# Patient Record
Sex: MALE | Race: WHITE | URBAN - METROPOLITAN AREA
[De-identification: names, ages, dates, MRNs, and addresses within clinical notes are randomized per-mention and may not be internally consistent; named-entity substitution may affect disease eponyms.]

---

## 2017-04-03 ENCOUNTER — OFFICE VISIT (OUTPATIENT)
Dept: INTERNAL MEDICINE | Age: 52
End: 2017-04-03

## 2017-04-03 VITALS
SYSTOLIC BLOOD PRESSURE: 122 MMHG | OXYGEN SATURATION: 99 % | HEART RATE: 80 BPM | DIASTOLIC BLOOD PRESSURE: 94 MMHG | BODY MASS INDEX: 27.4 KG/M2 | WEIGHT: 202 LBS

## 2017-04-03 DIAGNOSIS — E78.6 HDL DEFICIENCY: Chronic | ICD-10-CM

## 2017-04-03 DIAGNOSIS — R73.9 HYPERGLYCEMIA: Chronic | ICD-10-CM

## 2017-04-03 DIAGNOSIS — E78.1 HYPERTRIGLYCERIDEMIA: Chronic | ICD-10-CM

## 2017-04-03 DIAGNOSIS — R73.03 PREDIABETES: Primary | Chronic | ICD-10-CM

## 2017-04-03 DIAGNOSIS — R74.8 ELEVATED LIVER ENZYMES: ICD-10-CM

## 2017-04-03 DIAGNOSIS — N18.3 CHRONIC KIDNEY DISEASE (CKD), STAGE 3 (MODERATE): Chronic | ICD-10-CM

## 2017-04-03 DIAGNOSIS — R97.20 ELEVATED PSA: ICD-10-CM

## 2017-04-03 LAB
ALBUMIN SERPL-MCNC: 4.7 G/DL (ref 3.4–5)
ALP BLD-CCNC: 97 U/L (ref 40–129)
ALT SERPL-CCNC: 21 U/L (ref 10–40)
ANION GAP SERPL CALCULATED.3IONS-SCNC: 13 MMOL/L (ref 3–16)
AST SERPL-CCNC: 17 U/L (ref 15–37)
BILIRUB SERPL-MCNC: 0.3 MG/DL (ref 0–1)
BILIRUBIN DIRECT: <0.2 MG/DL (ref 0–0.3)
BILIRUBIN, INDIRECT: NORMAL MG/DL (ref 0–1)
BUN BLDV-MCNC: 14 MG/DL (ref 7–20)
CALCIUM SERPL-MCNC: 9.4 MG/DL (ref 8.3–10.6)
CHLORIDE BLD-SCNC: 102 MMOL/L (ref 99–110)
CHOLESTEROL, TOTAL: 189 MG/DL (ref 0–199)
CO2: 25 MMOL/L (ref 21–32)
CREAT SERPL-MCNC: 0.9 MG/DL (ref 0.9–1.3)
GFR AFRICAN AMERICAN: >60
GFR NON-AFRICAN AMERICAN: >60
GLUCOSE BLD-MCNC: 109 MG/DL (ref 70–99)
HDLC SERPL-MCNC: 41 MG/DL (ref 40–60)
LDL CHOLESTEROL CALCULATED: 111 MG/DL
PHOSPHORUS: 3.2 MG/DL (ref 2.5–4.9)
POTASSIUM SERPL-SCNC: 4.8 MMOL/L (ref 3.5–5.1)
PROSTATE SPECIFIC ANTIGEN: 3.09 NG/ML (ref 0–4)
SODIUM BLD-SCNC: 140 MMOL/L (ref 136–145)
TOTAL PROTEIN: 7.2 G/DL (ref 6.4–8.2)
TRIGL SERPL-MCNC: 185 MG/DL (ref 0–150)
VLDLC SERPL CALC-MCNC: 37 MG/DL

## 2017-04-03 PROCEDURE — 99214 OFFICE O/P EST MOD 30 MIN: CPT | Performed by: INTERNAL MEDICINE

## 2017-04-04 ENCOUNTER — TELEPHONE (OUTPATIENT)
Dept: INTERNAL MEDICINE | Age: 52
End: 2017-04-04

## 2017-04-04 LAB
ESTIMATED AVERAGE GLUCOSE: 125.5 MG/DL
HBA1C MFR BLD: 6 %

## 2017-04-08 RX ORDER — LORATADINE 10 MG/1
10 TABLET ORAL DAILY
COMMUNITY
Start: 2017-04-08 | End: 2022-08-09

## 2017-04-08 ASSESSMENT — ENCOUNTER SYMPTOMS
SHORTNESS OF BREATH: 0
RESPIRATORY NEGATIVE: 1
GASTROINTESTINAL NEGATIVE: 1
EYES NEGATIVE: 1

## 2019-02-21 ENCOUNTER — OFFICE VISIT (OUTPATIENT)
Dept: DERMATOLOGY | Age: 54
End: 2019-02-21
Payer: COMMERCIAL

## 2019-02-21 DIAGNOSIS — L82.1 SK (SEBORRHEIC KERATOSIS): ICD-10-CM

## 2019-02-21 DIAGNOSIS — L73.8 SEBACEOUS GLAND HYPERPLASIA OF FACE: ICD-10-CM

## 2019-02-21 DIAGNOSIS — L65.9 ALOPECIA: Primary | ICD-10-CM

## 2019-02-21 PROCEDURE — G8421 BMI NOT CALCULATED: HCPCS | Performed by: DERMATOLOGY

## 2019-02-21 PROCEDURE — 3017F COLORECTAL CA SCREEN DOC REV: CPT | Performed by: DERMATOLOGY

## 2019-02-21 PROCEDURE — 11104 PUNCH BX SKIN SINGLE LESION: CPT | Performed by: DERMATOLOGY

## 2019-02-21 PROCEDURE — 11105 PUNCH BX SKIN EA SEP/ADDL: CPT | Performed by: DERMATOLOGY

## 2019-02-21 PROCEDURE — G8484 FLU IMMUNIZE NO ADMIN: HCPCS | Performed by: DERMATOLOGY

## 2019-02-21 PROCEDURE — 99213 OFFICE O/P EST LOW 20 MIN: CPT | Performed by: DERMATOLOGY

## 2019-02-21 PROCEDURE — G8427 DOCREV CUR MEDS BY ELIG CLIN: HCPCS | Performed by: DERMATOLOGY

## 2019-02-21 PROCEDURE — 1036F TOBACCO NON-USER: CPT | Performed by: DERMATOLOGY

## 2019-03-01 ENCOUNTER — NURSE ONLY (OUTPATIENT)
Dept: DERMATOLOGY | Age: 54
End: 2019-03-01

## 2019-03-01 ENCOUNTER — TELEPHONE (OUTPATIENT)
Dept: DERMATOLOGY | Age: 54
End: 2019-03-01

## 2019-03-01 DIAGNOSIS — Z48.02 ENCOUNTER FOR REMOVAL OF SUTURES: Primary | ICD-10-CM

## 2019-03-01 PROCEDURE — 99024 POSTOP FOLLOW-UP VISIT: CPT | Performed by: DERMATOLOGY

## 2019-03-04 ENCOUNTER — PATIENT MESSAGE (OUTPATIENT)
Dept: DERMATOLOGY | Age: 54
End: 2019-03-04

## 2019-03-06 ENCOUNTER — TELEPHONE (OUTPATIENT)
Dept: DERMATOLOGY | Age: 54
End: 2019-03-06

## 2019-03-06 RX ORDER — FLUOCINONIDE TOPICAL SOLUTION USP, 0.05% 0.5 MG/ML
SOLUTION TOPICAL
Qty: 60 ML | Refills: 2 | Status: SHIPPED | OUTPATIENT
Start: 2019-03-06 | End: 2020-02-24 | Stop reason: SDUPTHER

## 2019-03-11 ENCOUNTER — PATIENT MESSAGE (OUTPATIENT)
Dept: DERMATOLOGY | Age: 54
End: 2019-03-11

## 2019-03-12 ENCOUNTER — OFFICE VISIT (OUTPATIENT)
Dept: DERMATOLOGY | Age: 54
End: 2019-03-12

## 2019-03-12 DIAGNOSIS — L66.1 LICHEN PLANOPILARIS: Primary | ICD-10-CM

## 2019-03-12 PROCEDURE — 11900 INJECT SKIN LESIONS </W 7: CPT | Performed by: DERMATOLOGY

## 2019-04-24 ENCOUNTER — OFFICE VISIT (OUTPATIENT)
Dept: DERMATOLOGY | Age: 54
End: 2019-04-24
Payer: COMMERCIAL

## 2019-04-24 DIAGNOSIS — L66.1 LICHEN PLANOPILARIS: Primary | ICD-10-CM

## 2019-04-24 PROCEDURE — 11900 INJECT SKIN LESIONS </W 7: CPT | Performed by: DERMATOLOGY

## 2019-04-24 PROCEDURE — 99999 PR OFFICE/OUTPT VISIT,PROCEDURE ONLY: CPT | Performed by: DERMATOLOGY

## 2019-04-24 NOTE — PROGRESS NOTES
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 Return in about 6 weeks (around 6/5/2019).

## 2019-06-27 ENCOUNTER — OFFICE VISIT (OUTPATIENT)
Dept: DERMATOLOGY | Age: 54
End: 2019-06-27
Payer: COMMERCIAL

## 2019-06-27 DIAGNOSIS — L66.1 LICHEN PLANOPILARIS: Primary | ICD-10-CM

## 2019-06-27 PROCEDURE — 11900 INJECT SKIN LESIONS </W 7: CPT | Performed by: DERMATOLOGY

## 2019-06-27 NOTE — PROGRESS NOTES
[facility] Dermatology  Jamil Henley MD  [phone]      Jayson Robert  1965    47 y.o. male     Date of Visit: 6/27/2019    Chief Complaint: lichen planopilaris    History of Present Illness:    He returns today to follow up for lichen planopilaris of the scalp. He complains of few pruritic areas on the scalp today. Has improved with intralesional Kenalog (5 mg/mL) injections. Just resumed using Lidex solution. Review of Systems:  None. Past Medical History, Family History, Surgical History, Medications and Allergies reviewed. Past Medical History:   Diagnosis Date    Chronic kidney disease (CKD) 11/3/2016    Dysthymia 11/2/2016    HDL deficiency 11/2/2016    Hyperglycemia 11/2/2016    Hypertriglyceridemia 11/2/2016    Prediabetes 11/2/2016    [facility] 66/0/0867     Past Surgical History:   Procedure Laterality Date    SIGMOIDOSCOPY  07/2016    Dr. Shepherd Fail    Dr. Priscila Maya and Dr. Tio Juarez of [facility]       No Known Allergies  Outpatient Medications Marked as Taking for the 6/27/19 encounter (Office Visit) with Alexey Chu MD   Medication Sig Dispense Refill    Fexofenadine (ALLEGRA) 30 MG dissolvable tablet Take 30 mg by mouth every 12 hours      vitamin D (CHOLECALCIFEROL) 1000 UNIT TABS tablet Take 1,000 Units by mouth daily      fluocinonide (LIDEX) 0.05 % external solution Apply to affected areas of the scalp daily. 60 mL 2    Ginkgo Biloba 120 MG CAPS Take 120 mg by mouth daily      Probiotic CAPS Take 1 capsule by mouth daily      buPROPion (WELLBUTRIN XL) 150 MG extended release tablet Take 1 tablet by mouth every morning      FLUoxetine (PROZAC) 20 MG capsule Take 3 capsules by mouth daily         Physical Examination     Well appearing.     1.  Right parietal scalp, left frontoparietal scalp and focally on the left posterior crown are oval shaped discrete and coalescing alopecic patches with peripheral erythema and mild perifollicular scaling. Assessment and Plan     1. Lichen planopilaris - few active areas today    Intralesional Kenalog 5 mg/mL - 1.5 mL injected into 3 patches on the scalp: right parietal, left frontoparietal, and left posterior crown. Continue Lidex solution daily . Return in about 2 months (around 9/5/2019).

## 2019-09-05 ENCOUNTER — OFFICE VISIT (OUTPATIENT)
Dept: DERMATOLOGY | Age: 54
End: 2019-09-05
Payer: COMMERCIAL

## 2019-09-05 DIAGNOSIS — L66.1 LICHEN PLANOPILARIS: Primary | ICD-10-CM

## 2019-09-05 PROCEDURE — 11900 INJECT SKIN LESIONS </W 7: CPT | Performed by: DERMATOLOGY

## 2019-09-05 NOTE — PROGRESS NOTES
Focally involving the right parietal scalp, left parietal scalp and left frontal scalp are small erythematous patches with perifollicular scaling. Scattered on the vertex scalp are round oval alopecic patches. Assessment and Plan     1. Lichen planopilaris - mild focal activity    Intralesional Kenalog 5 mg/mL - 1.5 mL to 3 patches on the scalp (left parietal, right parietal and left frontal). Return in about 8 weeks (around 10/31/2019).

## 2019-10-29 ENCOUNTER — OFFICE VISIT (OUTPATIENT)
Dept: DERMATOLOGY | Age: 54
End: 2019-10-29
Payer: COMMERCIAL

## 2019-10-29 DIAGNOSIS — L66.1 LICHEN PLANOPILARIS: Primary | ICD-10-CM

## 2019-10-29 PROCEDURE — 11900 INJECT SKIN LESIONS </W 7: CPT | Performed by: DERMATOLOGY

## 2019-12-16 ENCOUNTER — OFFICE VISIT (OUTPATIENT)
Dept: DERMATOLOGY | Age: 54
End: 2019-12-16
Payer: COMMERCIAL

## 2019-12-16 VITALS — BODY MASS INDEX: 27.23 KG/M2 | WEIGHT: 200.8 LBS

## 2019-12-16 DIAGNOSIS — Z79.899 ENCOUNTER FOR LONG-TERM CURRENT USE OF MEDICATION: ICD-10-CM

## 2019-12-16 DIAGNOSIS — L66.1 LICHEN PLANOPILARIS: Primary | ICD-10-CM

## 2019-12-16 PROCEDURE — 11900 INJECT SKIN LESIONS </W 7: CPT | Performed by: DERMATOLOGY

## 2019-12-16 PROCEDURE — 3017F COLORECTAL CA SCREEN DOC REV: CPT | Performed by: DERMATOLOGY

## 2019-12-16 PROCEDURE — G8427 DOCREV CUR MEDS BY ELIG CLIN: HCPCS | Performed by: DERMATOLOGY

## 2019-12-16 PROCEDURE — G8419 CALC BMI OUT NRM PARAM NOF/U: HCPCS | Performed by: DERMATOLOGY

## 2019-12-16 PROCEDURE — 1036F TOBACCO NON-USER: CPT | Performed by: DERMATOLOGY

## 2019-12-16 PROCEDURE — G8484 FLU IMMUNIZE NO ADMIN: HCPCS | Performed by: DERMATOLOGY

## 2019-12-16 PROCEDURE — 99213 OFFICE O/P EST LOW 20 MIN: CPT | Performed by: DERMATOLOGY

## 2019-12-16 RX ORDER — LISINOPRIL 2.5 MG/1
2.5 TABLET ORAL
COMMUNITY
Start: 2019-11-12

## 2019-12-16 RX ORDER — ROSUVASTATIN CALCIUM 10 MG/1
TABLET, COATED ORAL
Refills: 3 | COMMUNITY
Start: 2019-11-12

## 2020-01-02 RX ORDER — HYDROXYCHLOROQUINE SULFATE 200 MG/1
200 TABLET, FILM COATED ORAL 2 TIMES DAILY
Qty: 60 TABLET | Refills: 3 | Status: SHIPPED | OUTPATIENT
Start: 2020-01-02 | End: 2020-07-21 | Stop reason: SDUPTHER

## 2020-02-24 ENCOUNTER — OFFICE VISIT (OUTPATIENT)
Dept: DERMATOLOGY | Age: 55
End: 2020-02-24
Payer: COMMERCIAL

## 2020-02-24 PROCEDURE — 11900 INJECT SKIN LESIONS </W 7: CPT | Performed by: DERMATOLOGY

## 2020-02-24 PROCEDURE — G8427 DOCREV CUR MEDS BY ELIG CLIN: HCPCS | Performed by: DERMATOLOGY

## 2020-02-24 PROCEDURE — 1036F TOBACCO NON-USER: CPT | Performed by: DERMATOLOGY

## 2020-02-24 PROCEDURE — 99212 OFFICE O/P EST SF 10 MIN: CPT | Performed by: DERMATOLOGY

## 2020-02-24 PROCEDURE — 3017F COLORECTAL CA SCREEN DOC REV: CPT | Performed by: DERMATOLOGY

## 2020-02-24 PROCEDURE — G8484 FLU IMMUNIZE NO ADMIN: HCPCS | Performed by: DERMATOLOGY

## 2020-02-24 PROCEDURE — G8419 CALC BMI OUT NRM PARAM NOF/U: HCPCS | Performed by: DERMATOLOGY

## 2020-02-24 RX ORDER — FLUOCINONIDE TOPICAL SOLUTION USP, 0.05% 0.5 MG/ML
SOLUTION TOPICAL
Qty: 60 ML | Refills: 2 | Status: SHIPPED | OUTPATIENT
Start: 2020-02-24 | End: 2020-06-10 | Stop reason: SDUPTHER

## 2020-02-24 NOTE — PROGRESS NOTES
[facility] Dermatology  Laron Dominique MD  [phone]      Tramaine Villasenor  1965    49 y.o. male     Date of Visit: 2/24/2020    Chief Complaint: scalp problem    History of Present Illness:    1. Here today to follow-up for lichen planopilaris of the scalp. He complains of few pruritic areas today mainly on the crown of the scalp and also on the left vertex scalp. He has been on Plaquenil 200 mg twice daily for about the last 6 weeks with some improvement. 1/2/2020: started Plaquenil. Review of Systems:  Neuro: no HA  Eyes: no vision changes. Past Medical History, Family History, Surgical History, Medications and Allergies reviewed. Past Medical History:   Diagnosis Date    Chronic kidney disease (CKD) 11/3/2016    Dysthymia 11/2/2016    HDL deficiency 11/2/2016    Hyperglycemia 11/2/2016    Hypertriglyceridemia 11/2/2016    Prediabetes 11/2/2016    [facility] 31/4/2142     Past Surgical History:   Procedure Laterality Date    SIGMOIDOSCOPY  07/2016    Dr. Marco Salcedo and Dr. Brayden Collier of [facility]       No Known Allergies  Outpatient Medications Marked as Taking for the 2/24/20 encounter (Office Visit) with Iliana Pool MD   Medication Sig Dispense Refill    fluocinonide (LIDEX) 0.05 % external solution Apply to affected areas of the scalp daily.  60 mL 2    hydroxychloroquine (PLAQUENIL) 200 MG tablet Take 1 tablet by mouth 2 times daily 60 tablet 3    metFORMIN (GLUCOPHAGE) 500 MG tablet Take 500 mg by mouth      lisinopril (PRINIVIL;ZESTRIL) 2.5 MG tablet Take 2.5 mg by mouth      rosuvastatin (CRESTOR) 10 MG tablet TK 1 T PO D  3    vitamin D (CHOLECALCIFEROL) 1000 UNIT TABS tablet Take 1,000 Units by mouth daily      loratadine (CLARITIN) 10 MG tablet Take 1 tablet by mouth daily      Ginkgo Biloba 120 MG CAPS Take 120 mg by mouth

## 2020-06-10 ENCOUNTER — OFFICE VISIT (OUTPATIENT)
Dept: DERMATOLOGY | Age: 55
End: 2020-06-10
Payer: COMMERCIAL

## 2020-06-10 VITALS — TEMPERATURE: 97.9 F

## 2020-06-10 PROCEDURE — G8419 CALC BMI OUT NRM PARAM NOF/U: HCPCS | Performed by: DERMATOLOGY

## 2020-06-10 PROCEDURE — 99213 OFFICE O/P EST LOW 20 MIN: CPT | Performed by: DERMATOLOGY

## 2020-06-10 PROCEDURE — 3017F COLORECTAL CA SCREEN DOC REV: CPT | Performed by: DERMATOLOGY

## 2020-06-10 PROCEDURE — G8427 DOCREV CUR MEDS BY ELIG CLIN: HCPCS | Performed by: DERMATOLOGY

## 2020-06-10 PROCEDURE — 1036F TOBACCO NON-USER: CPT | Performed by: DERMATOLOGY

## 2020-06-10 RX ORDER — FLUOCINONIDE TOPICAL SOLUTION USP, 0.05% 0.5 MG/ML
SOLUTION TOPICAL
Qty: 60 ML | Refills: 2 | Status: SHIPPED | OUTPATIENT
Start: 2020-06-10

## 2020-06-10 RX ORDER — FLUOCINONIDE TOPICAL SOLUTION USP, 0.05% 0.5 MG/ML
SOLUTION TOPICAL
Qty: 60 ML | Refills: 2 | Status: SHIPPED | OUTPATIENT
Start: 2020-06-10 | End: 2020-06-10 | Stop reason: SDUPTHER

## 2020-06-10 NOTE — PROGRESS NOTES
portions of the scalp are few areas with very mild perifollicular erythema but very little or no scaling. Assessment and Plan     1. Lichen planopilaris -markedly improved on Plaquenil therapy, still with mild focal activity    Lidex solution daily to affected areas until improved. Continue Plaquenil 200 mg twice daily. Return in about 2 months (around 8/10/2020).

## 2020-07-21 ENCOUNTER — PATIENT MESSAGE (OUTPATIENT)
Dept: DERMATOLOGY | Age: 55
End: 2020-07-21

## 2020-07-21 RX ORDER — HYDROXYCHLOROQUINE SULFATE 200 MG/1
200 TABLET, FILM COATED ORAL 2 TIMES DAILY
Qty: 60 TABLET | Refills: 2 | Status: SHIPPED | OUTPATIENT
Start: 2020-07-21 | End: 2020-08-31

## 2020-07-21 NOTE — TELEPHONE ENCOUNTER
From: Rizwan Lynn  To: Jessika Grady MD  Sent: 7/21/2020 11:15 AM EDT  Subject: Prescription Question    Dr. Lebron Kerns:    I hope you and your family are well! I have been switching my prescriptions to mail order (for convenience and due to the pandemic). I placed a 90-day (90 tablets) supply order for Hydroxychloroquine with [address] [place] [address] which should be reaching out to your office for confirmation. Thanks so much!  Rizwan Lynn

## 2020-08-12 ENCOUNTER — OFFICE VISIT (OUTPATIENT)
Dept: DERMATOLOGY | Age: 55
End: 2020-08-12
Payer: COMMERCIAL

## 2020-08-12 VITALS — TEMPERATURE: 97.2 F

## 2020-08-12 PROCEDURE — G8419 CALC BMI OUT NRM PARAM NOF/U: HCPCS | Performed by: DERMATOLOGY

## 2020-08-12 PROCEDURE — 3017F COLORECTAL CA SCREEN DOC REV: CPT | Performed by: DERMATOLOGY

## 2020-08-12 PROCEDURE — 1036F TOBACCO NON-USER: CPT | Performed by: DERMATOLOGY

## 2020-08-12 PROCEDURE — 99213 OFFICE O/P EST LOW 20 MIN: CPT | Performed by: DERMATOLOGY

## 2020-08-12 PROCEDURE — G8427 DOCREV CUR MEDS BY ELIG CLIN: HCPCS | Performed by: DERMATOLOGY

## 2020-08-12 NOTE — PROGRESS NOTES
[facility] Dermatology  Valerie Rutledge MD  [phone]      Malvin Boeck  1965    49 y.o. male     Date of Visit: 8/12/2020    Chief Complaint: lichen planopilaris    History of Present Illness:    He returns today to follow-up for lichen planopilaris affecting the scalp. He reports marked improvement with Plaquenil 200 mg daily over the past 7 months. He recently started taking 200 mg twice daily. He denies any itching or burning of the scalp. Has used Lidex solution in the past but not needed to recently. Had intralesional Kenalog injections in the past.    1/2/2020: started Plaquenil. He recently had a comprehensive eye exam and visual field testing. Review of Systems:  Eyes: no vision changes. Past Medical History, Family History, Surgical History, Medications and Allergies reviewed. Past Medical History:   Diagnosis Date    Chronic kidney disease (CKD) 11/3/2016    Dysthymia 11/2/2016    HDL deficiency 11/2/2016    Hyperglycemia 11/2/2016    Hypertriglyceridemia 11/2/2016    Prediabetes 11/2/2016    [facility]cer[facility] complications ([facility] 51/2/9107     Past Surgical History:   Procedure Laterality Date    SIGMOIDOSCOPY  07/2016    Dr. Meagan Shi and Dr. Malcolm Sears of [facility]       No Known Allergies  Outpatient Medications Marked as Taking for the 8/12/20 encounter (Office Visit) with Bri Banuelos MD   Medication Sig Dispense Refill    hydroxychloroquine (PLAQUENIL) 200 MG tablet Take 1 tablet by mouth 2 times daily 60 tablet 2    fluocinonide (LIDEX) 0.05 % external solution Apply to affected areas of the scalp daily.  60 mL 2    metFORMIN (GLUCOPHAGE) 500 MG tablet Take 500 mg by mouth      lisinopril (PRINIVIL;ZESTRIL) 2.5 MG tablet Take 2.5 mg by mouth      rosuvastatin (CRESTOR) 10 MG tablet TK 1 T PO D  3    vitamin D (CHOLECALCIFEROL) 1000 UNIT TABS tablet Take 1,000 Units by mouth daily      loratadine (CLARITIN) 10 MG tablet Take 1 tablet by mouth daily      Ginkgo Biloba 120 MG CAPS Take 120 mg by mouth daily      Probiotic CAPS Take 1 capsule by mouth daily      loperamide (IMODIUM A-D) 2 MG tablet Take 1 tablet by mouth nightly      buPROPion (WELLBUTRIN XL) 150 MG extended release tablet Take 1 tablet by mouth every morning      FLUoxetine (PROZAC) 20 MG capsule Take 3 capsules by mouth daily         Physical Examination       The following were examined and determined to be normal: Psych/Neuro, Head/face, Conjunctivae/eyelids, Gums/teeth/lips and Neck. The following were examined and determined to be abnormal: Scalp/hair. Well appearing. 1.  Scalp with scattered round smooth alopecic patches with loss of follicular ostia. He does have few areas of very mild erythema. There is 1 focus of very mild perifollicular scaling on the posterior crown of the scalp. Assessment and Plan     1. Lichen planopilaris - much improved on Plaquenil    Continue Plaquenil 200 mg twice daily. Continue eye exams/visual field testing at least yearly. Lidex solution daily as needed. Return in about 4 months (around 12/12/2020).

## 2020-08-31 RX ORDER — HYDROXYCHLOROQUINE SULFATE 200 MG/1
TABLET, FILM COATED ORAL
Qty: 180 TABLET | Refills: 3 | Status: SHIPPED | OUTPATIENT
Start: 2020-08-31 | End: 2021-10-25

## 2020-12-11 ENCOUNTER — OFFICE VISIT (OUTPATIENT)
Dept: DERMATOLOGY | Age: 55
End: 2020-12-11
Payer: COMMERCIAL

## 2020-12-11 VITALS — TEMPERATURE: 97.5 F

## 2020-12-11 PROCEDURE — G8484 FLU IMMUNIZE NO ADMIN: HCPCS | Performed by: DERMATOLOGY

## 2020-12-11 PROCEDURE — G8419 CALC BMI OUT NRM PARAM NOF/U: HCPCS | Performed by: DERMATOLOGY

## 2020-12-11 PROCEDURE — 99213 OFFICE O/P EST LOW 20 MIN: CPT | Performed by: DERMATOLOGY

## 2020-12-11 PROCEDURE — 3017F COLORECTAL CA SCREEN DOC REV: CPT | Performed by: DERMATOLOGY

## 2020-12-11 PROCEDURE — 1036F TOBACCO NON-USER: CPT | Performed by: DERMATOLOGY

## 2020-12-11 PROCEDURE — G8427 DOCREV CUR MEDS BY ELIG CLIN: HCPCS | Performed by: DERMATOLOGY

## 2020-12-11 NOTE — PROGRESS NOTES
[facility] Dermatology  Linnie Kanner, MD  [phone]      Ramon Tejeda  1965    48 y.o. male     Date of Visit: 12/11/2020    Chief Complaint: lichen planopilaris    History of Present Illness:    He returns today to follow-up for lichen planopilaris of the scalp. He reports improvement with Plaquenil 200 mg twice daily. He rarely experiences any itching which is a marked improvement compared to when he first presented. He took Plaquenil 200 mg daily for 7 months of therapy and then increased to 200 mg twice daily about 4 months ago. 10/22/2020: CBC/diff, renal and LFTs all WNL. He had a eye exam and visual field testing prior to last visit. Review of Systems:  Eyes: No visual changes or increase in floaters. Past Medical History, Family History, Surgical History, Medications and Allergies reviewed. Past Medical History:   Diagnosis Date    Chronic kidney disease (CKD) 11/3/2016    Dysthymia 11/2/2016    HDL deficiency 11/2/2016    Hyperglycemia 11/2/2016    Hypertriglyceridemia 11/2/2016    Prediabetes 11/2/2016    [facility] 61/0/9210     Past Surgical History:   Procedure Laterality Date    SIGMOIDOSCOPY  07/2016    Dr. Prosper Diaz and Dr. Sara Ace of [facility]       No Known Allergies  Outpatient Medications Marked as Taking for the 12/11/20 encounter (Office Visit) with Richard Myers MD   Medication Sig Dispense Refill    hydroxychloroquine (PLAQUENIL) 200 MG tablet TAKE 1 TABLET BY MOUTH  TWICE DAILY 180 tablet 3    fluocinonide (LIDEX) 0.05 % external solution Apply to affected areas of the scalp daily.  60 mL 2    metFORMIN (GLUCOPHAGE) 500 MG tablet Take 500 mg by mouth      lisinopril (PRINIVIL;ZESTRIL) 2.5 MG tablet Take 2.5 mg by mouth      rosuvastatin (CRESTOR) 10 MG tablet TK 1 T PO D  3    vitamin D (CHOLECALCIFEROL) 1000 UNIT TABS tablet Take 1,000 Units by mouth daily      loratadine (CLARITIN) 10 MG tablet Take 1 tablet by mouth daily      Ginkgo Biloba 120 MG CAPS Take 120 mg by mouth daily      Probiotic CAPS Take 1 capsule by mouth daily      loperamide (IMODIUM A-D) 2 MG tablet Take 1 tablet by mouth nightly      buPROPion (WELLBUTRIN XL) 150 MG extended release tablet Take 1 tablet by mouth every morning      FLUoxetine (PROZAC) 20 MG capsule Take 3 capsules by mouth daily             Physical Examination       The following were examined and determined to be normal: Psych/Neuro, Head/face, Conjunctivae/eyelids, Gums/teeth/lips and Neck. The following were examined and determined to be abnormal: Scalp/hair. Well-appearing. 1.  Scattered on the vertex and crown of the scalp are multiple round smooth skin colored alopecic patches with loss of follicular ostia. There are few small areas with very mild perifollicular erythema and few areas very mild perifollicular scaling. Assessment and Plan     1. Lichen planopilaris -nearly clear/very mild disease activity on Plaquenil    Continue Plaquenil 200 mg twice daily. Reminded to continue at least yearly eye exams and visual field testing. Lidex solution daily as needed to any itchy or red areas. Return in about 6 months (around 6/11/2021).     --Victor Hugo Flowers MD

## 2021-06-10 ENCOUNTER — OFFICE VISIT (OUTPATIENT)
Dept: DERMATOLOGY | Age: 56
End: 2021-06-10
Payer: COMMERCIAL

## 2021-06-10 VITALS — TEMPERATURE: 98.2 F

## 2021-06-10 DIAGNOSIS — L66.1 LICHEN PLANOPILARIS: Primary | ICD-10-CM

## 2021-06-10 PROCEDURE — 99213 OFFICE O/P EST LOW 20 MIN: CPT | Performed by: DERMATOLOGY

## 2021-06-10 PROCEDURE — G8421 BMI NOT CALCULATED: HCPCS | Performed by: DERMATOLOGY

## 2021-06-10 PROCEDURE — 3017F COLORECTAL CA SCREEN DOC REV: CPT | Performed by: DERMATOLOGY

## 2021-06-10 PROCEDURE — G8427 DOCREV CUR MEDS BY ELIG CLIN: HCPCS | Performed by: DERMATOLOGY

## 2021-06-10 PROCEDURE — 1036F TOBACCO NON-USER: CPT | Performed by: DERMATOLOGY

## 2021-06-10 NOTE — PROGRESS NOTES
[facility] Dermatology  Felicia Soler MD  [phone]      Arnie Martinez  1965    48 y.o. male     Date of Visit: 6/10/2021    Chief Complaint: lichen planopilaris    History of Present Illness:    1. He returns today to follow up for extensive lichen planopilaris of the scalp. He reports marked improvement with Plaquenil 200 mg twice daily. Pruritus is now minimal and only intermittent. He took 200 mg of Plaquenil daily for about 7 months and then over the last 10 months has been on 200 mg twice daily. He's up to date on eye exams. Review of Systems:  Eyes: no vision changes or increase in floaters. Past Medical History, Family History, Surgical History, Medications and Allergies reviewed. Past Medical History:   Diagnosis Date    Chronic kidney disease (CKD) 11/3/2016    Dysthymia 11/2/2016    HDL deficiency 11/2/2016    Hyperglycemia 11/2/2016    Hypertriglyceridemia 11/2/2016    Prediabetes 11/2/2016    [facility] ([facility] 88/9/9930     Past Surgical History:   Procedure Laterality Date    SIGMOIDOSCOPY  07/2016    Dr. Apoorva Bass and Dr. Clayton Rodlan of [facility]       No Known Allergies  Outpatient Medications Marked as Taking for the 6/10/21 encounter (Office Visit) with Jodi Darden MD   Medication Sig Dispense Refill    hydroxychloroquine (PLAQUENIL) 200 MG tablet TAKE 1 TABLET BY MOUTH  TWICE DAILY 180 tablet 3    fluocinonide (LIDEX) 0.05 % external solution Apply to affected areas of the scalp daily.  60 mL 2    metFORMIN (GLUCOPHAGE) 500 MG tablet Take 500 mg by mouth      lisinopril (PRINIVIL;ZESTRIL) 2.5 MG tablet Take 2.5 mg by mouth      rosuvastatin (CRESTOR) 10 MG tablet TK 1 T PO D  3    vitamin D (CHOLECALCIFEROL) 1000 UNIT TABS tablet Take 1,000 Units by mouth daily      loratadine (CLARITIN) 10 MG tablet Take 1 tablet by mouth daily      Ginkgo Biloba 120 MG CAPS Take 120 mg by mouth daily      Probiotic CAPS Take 1 capsule by mouth daily      loperamide (IMODIUM A-D) 2 MG tablet Take 1 tablet by mouth nightly      buPROPion (WELLBUTRIN XL) 150 MG extended release tablet Take 1 tablet by mouth every morning      FLUoxetine (PROZAC) 20 MG capsule Take 3 capsules by mouth daily         Physical Examination       Well appearing. 1.  Scattered on the vertex and crown of the scalp with multiple round and oval skin colored alopecic patches with loss of follicular ostia. There a couple of very small areas with very mild erythema and scaling. Assessment and Plan     1. Lichen planopilaris - nearly clear/much improved on Plaquenil therapy    Continue Plaquenil 200 mg twice daily. Continue eye screening every year. Lidex solution daily as needed for any itching/erythema. Return in about 6 months (around 12/10/2021).     --Darrian MD Julio Cesar

## 2021-10-25 DIAGNOSIS — L66.1 LICHEN PLANOPILARIS: ICD-10-CM

## 2021-10-25 RX ORDER — HYDROXYCHLOROQUINE SULFATE 200 MG/1
TABLET, FILM COATED ORAL
Qty: 180 TABLET | Refills: 3 | Status: SHIPPED | OUTPATIENT
Start: 2021-10-25 | End: 2022-10-11

## 2021-12-09 ENCOUNTER — OFFICE VISIT (OUTPATIENT)
Dept: DERMATOLOGY | Age: 56
End: 2021-12-09
Payer: COMMERCIAL

## 2021-12-09 VITALS — TEMPERATURE: 97.2 F

## 2021-12-09 DIAGNOSIS — L82.1 SK (SEBORRHEIC KERATOSIS): ICD-10-CM

## 2021-12-09 DIAGNOSIS — L66.1 LICHEN PLANOPILARIS: Primary | ICD-10-CM

## 2021-12-09 PROCEDURE — 99213 OFFICE O/P EST LOW 20 MIN: CPT | Performed by: DERMATOLOGY

## 2021-12-09 PROCEDURE — G8427 DOCREV CUR MEDS BY ELIG CLIN: HCPCS | Performed by: DERMATOLOGY

## 2021-12-09 PROCEDURE — 3017F COLORECTAL CA SCREEN DOC REV: CPT | Performed by: DERMATOLOGY

## 2021-12-09 PROCEDURE — G8421 BMI NOT CALCULATED: HCPCS | Performed by: DERMATOLOGY

## 2021-12-09 PROCEDURE — 1036F TOBACCO NON-USER: CPT | Performed by: DERMATOLOGY

## 2021-12-09 PROCEDURE — G8484 FLU IMMUNIZE NO ADMIN: HCPCS | Performed by: DERMATOLOGY

## 2021-12-09 NOTE — PROGRESS NOTES
[facility] Dermatology  Sheldon Benedict MD  [phone]      Antony Talbot  1965    55 y.o. male     Date of Visit: 12/9/2021    Chief Complaint: lichen planopilaris    History of Present Illness:    He was last seen about 6 months ago. 1.  He returns today to follow-up for extensive lichen planopilaris of the scalp. He reports continued good control of his disease with Plaquenil 200 mg twice daily. He does experience intermittent pruritus mainly at night. Getting visual field testing yearly. 2.  He also complains of an asymptomatic lesion in the left axilla. Review of Systems:  Gen: Feels well, good sense of health. Eyes: no visual changes. Past Medical History, Family History, Surgical History, Medications and Allergies reviewed. Past Medical History:   Diagnosis Date    Chronic kidney disease (CKD) 11/3/2016    Dysthymia 11/2/2016    HDL deficiency 11/2/2016    Hyperglycemia 11/2/2016    Hypertriglyceridemia 11/2/2016    Prediabetes 11/2/2016    [facility] 95/4/9360     Past Surgical History:   Procedure Laterality Date    SIGMOIDOSCOPY  07/2016    Dr. Kelsie Hogan and Dr. Lola Chew of [facility]       No Known Allergies  Outpatient Medications Marked as Taking for the 12/9/21 encounter (Office Visit) with Joycie Hatchet, MD   Medication Sig Dispense Refill    hydroxychloroquine (PLAQUENIL) 200 MG tablet TAKE ONE TABLET BY MOUTH TWICE A  tablet 3    fluocinonide (LIDEX) 0.05 % external solution Apply to affected areas of the scalp daily.  60 mL 2    metFORMIN (GLUCOPHAGE) 500 MG tablet Take 500 mg by mouth      lisinopril (PRINIVIL;ZESTRIL) 2.5 MG tablet Take 2.5 mg by mouth      rosuvastatin (CRESTOR) 10 MG tablet TK 1 T PO D  3    vitamin D (CHOLECALCIFEROL) 1000 UNIT TABS tablet Take 1,000 Units by mouth daily      loratadine (CLARITIN) 10 MG tablet Take 1 tablet by mouth daily      Ginkgo Biloba 120 MG CAPS Take 120 mg by mouth daily      Probiotic CAPS Take 1 capsule by mouth daily      loperamide (IMODIUM A-D) 2 MG tablet Take 1 tablet by mouth nightly      buPROPion (WELLBUTRIN XL) 150 MG extended release tablet Take 1 tablet by mouth every morning      FLUoxetine (PROZAC) 20 MG capsule Take 3 capsules by mouth daily           Physical Examination       The following were examined and determined to be normal: Psych/Neuro, Head/face, Conjunctivae/eyelids, Gums/teeth/lips, Neck, Breast/axilla/chest, Abdomen, Back, RUE and LUE. The following were examined and determined to be abnormal: Scalp/hair. Well appearing. 1. Scalp with scattered round to oval alopecic patches with loss of follicular ostia. Periphery of few of the alopecic patches with very faint erythema. 2. Back and left axilla - stuck on appearing round to oval verrucous grey brown papules and plaques. Assessment and Plan     1. Lichen planopilaris of the scalp - minimal evidence of any disease activity, continues to do very well on Plaquenil    Continue Plaquenil 200 mg twice daily. Consider reduction in dose if continues to maintain remission. 2. SK (seborrheic keratosis)     Reassurance. Return in about 6 months (around 6/9/2022).     --Isaias Cardenas MD

## 2022-08-09 ENCOUNTER — OFFICE VISIT (OUTPATIENT)
Dept: DERMATOLOGY | Age: 57
End: 2022-08-09
Payer: COMMERCIAL

## 2022-08-09 DIAGNOSIS — L73.8 SEBACEOUS GLAND HYPERPLASIA OF FACE: ICD-10-CM

## 2022-08-09 DIAGNOSIS — L66.1 LICHEN PLANOPILARIS: Primary | ICD-10-CM

## 2022-08-09 PROCEDURE — 1036F TOBACCO NON-USER: CPT | Performed by: DERMATOLOGY

## 2022-08-09 PROCEDURE — G8421 BMI NOT CALCULATED: HCPCS | Performed by: DERMATOLOGY

## 2022-08-09 PROCEDURE — 3017F COLORECTAL CA SCREEN DOC REV: CPT | Performed by: DERMATOLOGY

## 2022-08-09 PROCEDURE — G8427 DOCREV CUR MEDS BY ELIG CLIN: HCPCS | Performed by: DERMATOLOGY

## 2022-08-09 PROCEDURE — 99213 OFFICE O/P EST LOW 20 MIN: CPT | Performed by: DERMATOLOGY

## 2022-08-09 NOTE — PROGRESS NOTES
[facility] Dermatology  Neva Dickerson MD  [phone]      Estefania Lind  1965    55 y.o. male     Date of Visit: 8/9/2022    Chief Complaint: lichen planopilaris    History of Present Illness: Follow up for lichen planopilaris of the scalp. He reports doing very well on Plaquenil 400 mg daily. Denies any consistent pruritus or discomfort of the scalp. Recent labs done on in March 2021 revealed normal LFTs and renal profile. Had recent normal retinal exam.    2.  He also complains of couple of asymptomatic lesions on the lower cheek that were noted by his wife. Son starting [employer]    Review of Systems:  Eyes: no vision changes, floaters    Past Medical History, Family History, Surgical History, Medications and Allergies reviewed. Past Medical History:   Diagnosis Date    Chronic kidney disease (CKD) 11/3/2016    Dysthymia 11/2/2016    HDL deficiency 11/2/2016    Hyperglycemia 11/2/2016    Hypertriglyceridemia 11/2/2016    Prediabetes 11/2/2016    Ulcerative colitis without complications ([facility] 00/1/5508     Past Surgical History:   Procedure Laterality Date    SIGMOIDOSCOPY  07/2016    Dr. Kevin Fontana and Dr. Jose Rodriguez of [facility]       No Known Allergies  Outpatient Medications Marked as Taking for the 8/9/22 encounter (Office Visit) with Mayela Easley MD   Medication Sig Dispense Refill    Loratadine-Pseudoephedrine (CLARITIN-D 24 HOUR PO) Take by mouth      hydroxychloroquine (PLAQUENIL) 200 MG tablet TAKE ONE TABLET BY MOUTH TWICE A  tablet 3    fluocinonide (LIDEX) 0.05 % external solution Apply to affected areas of the scalp daily.  60 mL 2    metFORMIN (GLUCOPHAGE) 500 MG tablet Take 500 mg by mouth      lisinopril (PRINIVIL;ZESTRIL) 2.5 MG tablet Take 2.5 mg by mouth      rosuvastatin (CRESTOR) 10 MG tablet TK 1 T PO D  3    vitamin D (CHOLECALCIFEROL) 1000 UNIT TABS tablet Take 1,000 Units by mouth daily      Ginkgo Biloba 120 MG CAPS Take 120 mg by mouth daily      Probiotic CAPS Take 1 capsule by mouth daily      loperamide (IMODIUM A-D) 2 MG tablet Take 1 tablet by mouth nightly      buPROPion (WELLBUTRIN XL) 150 MG extended release tablet Take 1 tablet by mouth every morning      FLUoxetine (PROZAC) 20 MG capsule Take 3 capsules by mouth daily             Physical Examination       The following were examined and determined to be normal: Psych/Neuro, Head/face, Conjunctivae/eyelids, Gums/teeth/lips, Neck, Breast/axilla/chest, Abdomen, Back, RUE, and LUE. The following were examined and determined to be abnormal: Scalp/hair. Well appearing. Vertex, crown and occipital scalp with round to oval skin colored alopecic patches. There is no perifollicular erythema or scaling. 2.  Right medial cheek and left medial and lower cheek - several umbilicated yellowish pink papules. Assessment and Plan     1. Lichen planopilaris - appears in remission on Plaquenil    Continue Plaquenil 400 mg daily. Discussed considering lowering the dose in the future. Lidex solution daily if needed for any itching. 2. Sebaceous gland hyperplasia of face     Reassurance. Return in about 6 months (around 2/9/2023).     --Coreen Stanford MD

## 2022-10-11 DIAGNOSIS — L66.1 LICHEN PLANOPILARIS: ICD-10-CM

## 2022-10-11 RX ORDER — HYDROXYCHLOROQUINE SULFATE 200 MG/1
TABLET, FILM COATED ORAL
Qty: 180 TABLET | Refills: 3 | Status: SHIPPED | OUTPATIENT
Start: 2022-10-11

## 2022-12-01 ENCOUNTER — PATIENT MESSAGE (OUTPATIENT)
Dept: DERMATOLOGY | Age: 57
End: 2022-12-01

## 2022-12-02 ENCOUNTER — OFFICE VISIT (OUTPATIENT)
Dept: DERMATOLOGY | Age: 57
End: 2022-12-02
Payer: COMMERCIAL

## 2022-12-02 DIAGNOSIS — L30.9 FACIAL DERMATITIS: Primary | ICD-10-CM

## 2022-12-02 PROCEDURE — 99213 OFFICE O/P EST LOW 20 MIN: CPT | Performed by: DERMATOLOGY

## 2022-12-02 PROCEDURE — G8427 DOCREV CUR MEDS BY ELIG CLIN: HCPCS | Performed by: DERMATOLOGY

## 2022-12-02 PROCEDURE — G8484 FLU IMMUNIZE NO ADMIN: HCPCS | Performed by: DERMATOLOGY

## 2022-12-02 PROCEDURE — G8421 BMI NOT CALCULATED: HCPCS | Performed by: DERMATOLOGY

## 2022-12-02 PROCEDURE — 3017F COLORECTAL CA SCREEN DOC REV: CPT | Performed by: DERMATOLOGY

## 2022-12-02 PROCEDURE — 1036F TOBACCO NON-USER: CPT | Performed by: DERMATOLOGY

## 2022-12-02 RX ORDER — TRIAMCINOLONE ACETONIDE 55 UG/1
SPRAY, METERED NASAL
COMMUNITY
Start: 2022-10-24

## 2022-12-02 RX ORDER — METRONIDAZOLE 7.5 MG/G
GEL TOPICAL 2 TIMES DAILY
COMMUNITY
Start: 2022-11-23

## 2022-12-02 RX ORDER — CIPROFLOXACIN 500 MG/1
TABLET, FILM COATED ORAL
COMMUNITY
Start: 2022-10-11

## 2022-12-02 RX ORDER — LEVOCETIRIZINE DIHYDROCHLORIDE 5 MG/1
TABLET, FILM COATED ORAL
COMMUNITY
Start: 2022-10-24

## 2022-12-02 NOTE — TELEPHONE ENCOUNTER
Sammie Sumner LPN 98/4/5199 10:28 AM EST      ----- Message -----  From: [facility]  Sent: 12/1/2022 11:25 AM EST  To: Liliya Romo Clinical Staff Pool  Subject: Dermatitis     I think I attached the same two.

## 2022-12-02 NOTE — PROGRESS NOTES
[facility]ermat[facility]  She Echols MD  [phone]      Bonifacio Bennett  1965    65 y.o. male     Date of Visit: 12/2/2022    Chief Complaint: dermatitis    History of Present Illness:    1. He reports a 2 weeks history of a mildly pruritic eruption on the face. He's not aware of any new exposures other than using the foaming facial cleanser from [employer] (vs the hydrating cleanser from [employer] that he typically uses). Review of Systems:  Gen: Feels well, good sense of health. Past Medical History, Family History, Surgical History, Medications and Allergies reviewed. Past Medical History:   Diagnosis Date    Chronic kidney disease (CKD) 11/3/2016    Dysthymia 11/2/2016    HDL deficiency 11/2/2016    Hyperglycemia 11/2/2016    Hypertriglyceridemia 11/2/2016    Prediabetes 11/2/2016    [facility] 53/3/9444     Past Surgical History:   Procedure Laterality Date    SIGMOIDOSCOPY  07/2016    Dr. Wan Roberson and Dr. Swapnil Baker of [facility]       No Known Allergies  Outpatient Medications Marked as Taking for the 12/2/22 encounter (Office Visit) with Rayna Aguilar MD   Medication Sig Dispense Refill    ciprofloxacin (CIPRO) 500 MG tablet       levocetirizine (XYZAL) 5 MG tablet       metroNIDAZOLE (METROGEL) 0.75 % gel Apply topically 2 times daily      triamcinolone (NASACORT) 55 MCG/ACT nasal inhaler       hydrocortisone 2.5 % ointment Apply to affected areas twice daily until improved. 20 g 1    hydroxychloroquine (PLAQUENIL) 200 MG tablet TAKE ONE TABLET BY MOUTH TWICE A  tablet 3    Loratadine-Pseudoephedrine (CLARITIN-D 24 HOUR PO) Take by mouth      fluocinonide (LIDEX) 0.05 % external solution Apply to affected areas of the scalp daily.  60 mL 2    metFORMIN (GLUCOPHAGE) 500 MG tablet Take 500 mg by mouth      lisinopril (PRINIVIL;ZESTRIL) 2.5 MG tablet Take 2.5 mg by mouth      rosuvastatin (CRESTOR) 10 MG tablet TK 1 T PO D  3    vitamin D (CHOLECALCIFEROL) 1000 UNIT TABS tablet Take 1,000 Units by mouth daily      Ginkgo Biloba 120 MG CAPS Take 120 mg by mouth daily      Probiotic CAPS Take 1 capsule by mouth daily      loperamide (IMODIUM A-D) 2 MG tablet Take 1 tablet by mouth nightly      buPROPion (WELLBUTRIN XL) 150 MG extended release tablet Take 1 tablet by mouth every morning      FLUoxetine (PROZAC) 20 MG capsule Take 3 capsules by mouth daily           Physical Examination       Well appearing. 1.  Right upper eyelid, right lower forehead, right temple, left lower eyelid with ill defined scaly pink patches. Assessment and Plan     1. Facial dermatitis - no culprit for contact etiology     Hydrocortisone 2.5% ointment twice daily until improved. Switch back to [employer] hydrating cleanser.          --Leopoldo Castillo MD

## 2023-02-15 ENCOUNTER — OFFICE VISIT (OUTPATIENT)
Dept: DERMATOLOGY | Age: 58
End: 2023-02-15
Payer: COMMERCIAL

## 2023-02-15 DIAGNOSIS — L30.9 FACIAL DERMATITIS: ICD-10-CM

## 2023-02-15 DIAGNOSIS — L66.1 LICHEN PLANOPILARIS: Primary | ICD-10-CM

## 2023-02-15 PROCEDURE — 99214 OFFICE O/P EST MOD 30 MIN: CPT | Performed by: DERMATOLOGY

## 2023-02-15 PROCEDURE — 1036F TOBACCO NON-USER: CPT | Performed by: DERMATOLOGY

## 2023-02-15 PROCEDURE — G8427 DOCREV CUR MEDS BY ELIG CLIN: HCPCS | Performed by: DERMATOLOGY

## 2023-02-15 PROCEDURE — 3017F COLORECTAL CA SCREEN DOC REV: CPT | Performed by: DERMATOLOGY

## 2023-02-15 PROCEDURE — G8421 BMI NOT CALCULATED: HCPCS | Performed by: DERMATOLOGY

## 2023-02-15 PROCEDURE — G8484 FLU IMMUNIZE NO ADMIN: HCPCS | Performed by: DERMATOLOGY

## 2023-02-15 RX ORDER — PREDNISONE 10 MG/1
TABLET ORAL
Qty: 40 TABLET | Refills: 0 | Status: SHIPPED | OUTPATIENT
Start: 2023-02-15 | End: 2023-03-03

## 2023-02-15 RX ORDER — TRIAMCINOLONE ACETONIDE 1 MG/G
CREAM TOPICAL
Qty: 30 G | Refills: 2 | Status: SHIPPED | OUTPATIENT
Start: 2023-02-15

## 2023-02-15 NOTE — PROGRESS NOTES
[facility]ermat[facility]  Jeff Henry MD  [phone]      Mehdi Fernandes  1965    65 y.o. male     Date of Visit: 2/15/2023    Chief Complaint: lichen planopilaris    History of Present Illness:    1. 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 It remains under great control with Plaquenil 400 mg daily. He is not dealing with any itching. 2.  He also complains of persistent facial dermatitis. He was last seen 2 months ago and given a prescription for hydrocortisone 2.5% ointment which led to only mild improvement. Review of Systems:  Gen: Feels well, good sense of health. Past Medical History, Family History, Surgical History, Medications and Allergies reviewed. Past Medical History:   Diagnosis Date    Chronic kidney disease (CKD) 11/3/2016    Dysthymia 11/2/2016    HDL deficiency 11/2/2016    Hyperglycemia 11/2/2016    Hypertriglyceridemia 11/2/2016    Prediabetes 11/2/2016    Ulcerative colitis without complications ([facility] 06/1/9950     Past Surgical History:   Procedure Laterality Date    SIGMOIDOSCOPY  07/2016    Dr. Chantel Barbosa and Dr. Sacha Shahid of [facility]       No Known Allergies  Outpatient Medications Marked as Taking for the 2/15/23 encounter (Office Visit) with Roman Lizama MD   Medication Sig Dispense Refill    triamcinolone (KENALOG) 0.1 % cream Apply to affected area twice daily for up to 2 weeks or until improved. 30 g 2    predniSONE (DELTASONE) 10 MG tablet Take 4 tablets by mouth daily for 4 days, THEN 3 tablets daily for 4 days, THEN 2 tablets daily for 4 days, THEN 1 tablet daily for 4 days.  40 tablet 0    ciprofloxacin (CIPRO) 500 MG tablet       levocetirizine (XYZAL) 5 MG tablet       metroNIDAZOLE (METROGEL) 0.75 % gel Apply topically 2 times daily      triamcinolone (NASACORT) 55 MCG/ACT nasal inhaler       hydroxychloroquine (PLAQUENIL) 200 MG tablet TAKE ONE TABLET BY MOUTH TWICE A  tablet 3    Loratadine-Pseudoephedrine (CLARITIN-D 24 HOUR PO) Take by mouth      fluocinonide (LIDEX) 0.05 % external solution Apply to affected areas of the scalp daily. 60 mL 2    metFORMIN (GLUCOPHAGE) 500 MG tablet Take 500 mg by mouth      lisinopril (PRINIVIL;ZESTRIL) 2.5 MG tablet Take 2.5 mg by mouth      rosuvastatin (CRESTOR) 10 MG tablet TK 1 T PO D  3    vitamin D (CHOLECALCIFEROL) 1000 UNIT TABS tablet Take 1,000 Units by mouth daily      Ginkgo Biloba 120 MG CAPS Take 120 mg by mouth daily      Probiotic CAPS Take 1 capsule by mouth daily      loperamide (IMODIUM A-D) 2 MG tablet Take 1 tablet by mouth nightly      buPROPion (WELLBUTRIN XL) 150 MG extended release tablet Take 1 tablet by mouth every morning      FLUoxetine (PROZAC) 20 MG capsule Take 3 capsules by mouth daily         Social History:    His son is a first year med student at [employer].      Physical Examination       The following were examined and determined to be normal: Psych/Neuro and Neck.    The following were examined and determined to be abnormal: Scalp/hair, Head/face, Conjunctivae/eyelids, and RUE.     Well appearing.     1.  Scalp with discrete and coalescing     2.  Upper eyelids, face and dorsum of the right hand with ill defined scaly pink patches.  Dorsum of the right hand with an ill-defined fissured scaly erythematous patch.      Assessment and Plan     1. Lichen planopilaris -no apparent disease activity today, remains in remission on hydroxychloroquine    Decrease hydroxychloroquine dose to 200 mg daily.       2. Facial dermatitis - worsened; question etiology-dry, cold weather versus less likely allergic contact etiology (given the absence of postauricular and neck involvement).    Prednisone 40 mg daily for 4 days, then 30 mg daily for 4 days, then 20 mg daily for 4 days, then 10 mg daily for 4 days.    Triamcinolone 0.1% cream twice daily until improved.    Consider  patch testing pending course. Return in about 6 months (around 8/15/2023).     --Lillian Díaz MD

## 2023-03-13 ENCOUNTER — PATIENT MESSAGE (OUTPATIENT)
Dept: DERMATOLOGY | Age: 58
End: 2023-03-13

## 2023-03-13 RX ORDER — PIMECROLIMUS 10 MG/G
CREAM TOPICAL
Qty: 30 G | Refills: 2 | Status: SHIPPED | OUTPATIENT
Start: 2023-03-13

## 2023-03-22 RX ORDER — TACROLIMUS 1 MG/G
OINTMENT TOPICAL
Qty: 30 G | Refills: 4 | Status: SHIPPED | OUTPATIENT
Start: 2023-03-22

## 2023-03-28 NOTE — TELEPHONE ENCOUNTER
Faxed written referral, last office note, and demographic sheet to Dr Yoko Willard office. Scanned under media.

## 2023-08-24 ENCOUNTER — OFFICE VISIT (OUTPATIENT)
Dept: DERMATOLOGY | Age: 58
End: 2023-08-24
Payer: COMMERCIAL

## 2023-08-24 DIAGNOSIS — L66.1 LICHEN PLANOPILARIS: Primary | ICD-10-CM

## 2023-08-24 DIAGNOSIS — L30.9 FACIAL DERMATITIS: ICD-10-CM

## 2023-08-24 PROCEDURE — 99213 OFFICE O/P EST LOW 20 MIN: CPT | Performed by: DERMATOLOGY

## 2023-08-24 PROCEDURE — 3017F COLORECTAL CA SCREEN DOC REV: CPT | Performed by: DERMATOLOGY

## 2023-08-24 PROCEDURE — 1036F TOBACCO NON-USER: CPT | Performed by: DERMATOLOGY

## 2023-08-24 PROCEDURE — G8427 DOCREV CUR MEDS BY ELIG CLIN: HCPCS | Performed by: DERMATOLOGY

## 2023-08-24 PROCEDURE — G8421 BMI NOT CALCULATED: HCPCS | Performed by: DERMATOLOGY

## 2023-08-24 NOTE — PROGRESS NOTES
[facility]  Germán Fernandez MD  [phone]      Kain Smith  1965    65 y.o. male     Date of Visit: 8/24/2023    Chief Complaint: lichen planopilaris, facial dermatitis    History of Present Illness:    1. He returns today to follow-up for chronic lichen planopilaris of the scalp. He was last seen 6 months ago and reports that involvement of the scalp has remained stable despite decreasing Plaquenil to 200 mg daily. He denies any redness, scaling or itching. 2.  He also returns today to follow-up for history of chronic facial dermatitis of unclear etiology. He reports that it had mostly resolved up until a couple of weeks ago. He reports some itchy scaly areas on the forehead and periocular regions. Review of Systems:  Gen: Feels well, good sense of health. Skin: No new or changing moles. Past Medical History, Family History, Surgical History, Medications and Allergies reviewed. Past Medical History:   Diagnosis Date    Chronic kidney disease (CKD) 11/3/2016    Dysthymia 11/2/2016    HDL deficiency 11/2/2016    Hyperglycemia 11/2/2016    Hypertriglyceridemia 11/2/2016    Prediabetes 11/2/2016    Ulcerative colitis without complications ([address]) 23/5/3055     Past Surgical History:   Procedure Laterality Date    SIGMOIDOSCOPY  07/2016    Dr. Maris Merritt and Dr. Khalida Melchor of [facility]       No Known Allergies  Outpatient Medications Marked as Taking for the 8/24/23 encounter (Office Visit) with Lavonne Chen MD   Medication Sig Dispense Refill    pimecrolimus (ELIDEL) 1 % cream Apply topically 2 times daily until improved. 30 g 2    triamcinolone (KENALOG) 0.1 % cream Apply to affected area twice daily for up to 2 weeks or until improved.  30 g 2    ciprofloxacin (CIPRO) 500 MG tablet       triamcinolone (NASACORT) 55 MCG/ACT nasal inhaler       metFORMIN (GLUCOPHAGE) 500 MG tablet

## 2024-02-12 ENCOUNTER — PATIENT MESSAGE (OUTPATIENT)
Dept: DERMATOLOGY | Age: 59
End: 2024-02-12

## 2024-02-12 NOTE — TELEPHONE ENCOUNTER
From: Gabriel Batista  To: Dr. Ranjan Harris  Sent: 2/9/2024 4:13 PM EST  Subject: Appointment Request    Appointment Request From: Gabriel Batista    With Provider: Ranjan Harris MD [[facility] Dermat[facility]]    Preferred Date Range: 3/18/2024 – 3/22/2024    Preferred Times: Monday Morning, Tuesday Morning, Wednesday Morning, Thursday Morning, Friday Morning    Reason for visit: Request an Appointment    Comments:  I need to reschedule my 2/29 appt because I will be out of town.

## 2024-03-18 ENCOUNTER — OFFICE VISIT (OUTPATIENT)
Dept: DERMATOLOGY | Age: 59
End: 2024-03-18
Payer: COMMERCIAL

## 2024-03-18 DIAGNOSIS — L66.1 LICHEN PLANOPILARIS: Primary | ICD-10-CM

## 2024-03-18 DIAGNOSIS — D18.01 CHERRY ANGIOMA: ICD-10-CM

## 2024-03-18 DIAGNOSIS — L30.9 FACIAL DERMATITIS: ICD-10-CM

## 2024-03-18 PROCEDURE — 99213 OFFICE O/P EST LOW 20 MIN: CPT | Performed by: DERMATOLOGY

## 2024-03-18 PROCEDURE — G8427 DOCREV CUR MEDS BY ELIG CLIN: HCPCS | Performed by: DERMATOLOGY

## 2024-03-18 PROCEDURE — G8421 BMI NOT CALCULATED: HCPCS | Performed by: DERMATOLOGY

## 2024-03-18 PROCEDURE — 3017F COLORECTAL CA SCREEN DOC REV: CPT | Performed by: DERMATOLOGY

## 2024-03-18 PROCEDURE — 1036F TOBACCO NON-USER: CPT | Performed by: DERMATOLOGY

## 2024-03-18 PROCEDURE — G8484 FLU IMMUNIZE NO ADMIN: HCPCS | Performed by: DERMATOLOGY

## 2024-03-18 NOTE — PROGRESS NOTES
by mouth      rosuvastatin (CRESTOR) 10 MG tablet TK 1 T PO D  3    vitamin D (CHOLECALCIFEROL) 1000 UNIT TABS tablet Take 1 tablet by mouth daily      Ginkgo Biloba 120 MG CAPS Take 120 mg by mouth daily      Probiotic CAPS Take 1 capsule by mouth daily      loperamide (IMODIUM A-D) 2 MG tablet Take 1 tablet by mouth nightly      buPROPion (WELLBUTRIN XL) 150 MG extended release tablet Take 1 tablet by mouth every morning      FLUoxetine (PROZAC) 20 MG capsule Take 3 capsules by mouth daily           Physical Examination       The following were examined and determined to be normal: Psych/Neuro, Scalp/hair, Head/face, Gums/teeth/lips, Neck, Breast/axilla/chest, Abdomen, Back, RUE, LUE, RLE, LLE, and Nails/digits.    The following were examined and determined to be abnormal: Conjunctivae/eyelids.     Well appearing.    1.  Scalp with discrete and coalescing round skin colored alopecic patches.  There is no erythema or perifollicular scaling.    2.  Upper eyelids with faint erythema and scaling.      3.  Abdomen with multiple round smooth brightly erythematous papules.          Assessment and Plan     1. Lichen planopilaris of the scalp -no signs of disease activity since stopping Plaquenil.    Observe for signs and symptoms of recurrence.  Patient to call/reach out to me if such occurs.     2. Facial dermatitis - much improved, mild eyelid dermatitis today    Elidel cream or Protopic ointment twice daily until improved.     3. Cherry angiomas.     Reassurance.          Return in about 1 year (around 3/18/2025).    --Ranjan Harris MD

## 2024-10-31 ENCOUNTER — PATIENT MESSAGE (OUTPATIENT)
Dept: DERMATOLOGY | Age: 59
End: 2024-10-31

## 2025-03-24 ENCOUNTER — OFFICE VISIT (OUTPATIENT)
Age: 60
End: 2025-03-24
Payer: COMMERCIAL

## 2025-03-24 DIAGNOSIS — L29.9 SCALP PRURITUS: ICD-10-CM

## 2025-03-24 DIAGNOSIS — L82.1 SK (SEBORRHEIC KERATOSIS): ICD-10-CM

## 2025-03-24 DIAGNOSIS — L66.10 LICHEN PLANOPILARIS: Primary | ICD-10-CM

## 2025-03-24 PROCEDURE — G8421 BMI NOT CALCULATED: HCPCS | Performed by: DERMATOLOGY

## 2025-03-24 PROCEDURE — G8427 DOCREV CUR MEDS BY ELIG CLIN: HCPCS | Performed by: DERMATOLOGY

## 2025-03-24 PROCEDURE — 99213 OFFICE O/P EST LOW 20 MIN: CPT | Performed by: DERMATOLOGY

## 2025-03-24 PROCEDURE — 1036F TOBACCO NON-USER: CPT | Performed by: DERMATOLOGY

## 2025-03-24 PROCEDURE — 3017F COLORECTAL CA SCREEN DOC REV: CPT | Performed by: DERMATOLOGY

## 2025-03-24 RX ORDER — EMPAGLIFLOZIN 25 MG/1
25 TABLET, FILM COATED ORAL DAILY
COMMUNITY
Start: 2024-01-01

## 2025-03-24 NOTE — PROGRESS NOTES
[facility] Dermat[facility]  Ranjan Harris MD  [phone]      Gabriel Batista  1965    59 y.o. male     Date of Visit: 3/24/2025    Chief Complaint: scalp issue, skin lesions    History of Present Illness:    1.  He has a history of chronic lichen planopilaris of the scalp.  He was previously treated with Plaquenil from January 2020 through August 2023.  He complains of some intermittent itching at times. Seemed to improved with OTC dandruff shampoo.     2.  He has asymptomatic growths on the left axilla dn right cheek.    He has a history of facial dermatitis exacerbated by hair pomade.  Treated with Elidel and Protopic in the past.      Review of Systems:  Gen: Feels well, good sense of health.      Past Medical History, Family History, Surgical History, Medications and Allergies reviewed.    Past Medical History:   Diagnosis Date    Chronic kidney disease (CKD) 11/3/2016    Dysthymia 11/2/2016    HDL deficiency 11/2/2016    Hyperglycemia 11/2/2016    Hypertriglyceridemia 11/2/2016    Prediabetes 11/2/2016    Ulcerative colitis without complications (HCC) 11/2/2016     Past Surgical History:   Procedure Laterality Date    SIGMOIDOSCOPY  07/2016    Dr. Georgia Bonner     TOTAL COLECTOMY  1996    Dr. Ronnie Khan and Dr. Ryan Stroud - [facility]       No Known Allergies  Outpatient Medications Marked as Taking for the 3/24/25 encounter (Office Visit) with Ranjan Harris MD   Medication Sig Dispense Refill    JARDIANCE 25 MG tablet Take 1 tablet by mouth daily      ciprofloxacin (CIPRO) 500 MG tablet       metFORMIN (GLUCOPHAGE) 500 MG tablet Take 1 tablet by mouth      lisinopril (PRINIVIL;ZESTRIL) 2.5 MG tablet Take 1 tablet by mouth      rosuvastatin (CRESTOR) 10 MG tablet TK 1 T PO D  3    vitamin D (CHOLECALCIFEROL) 1000 UNIT TABS tablet Take 1 tablet by mouth daily      Ginkgo Biloba 120 MG CAPS Take 120 mg by mouth daily      Probiotic CAPS Take 1 capsule by